# Patient Record
(demographics unavailable — no encounter records)

---

## 2025-07-23 NOTE — PROCEDURE
[1000 Hz] : 1000 Hz [Normal Eardrum Mobility] : consistent with restricted eardrum mobility [Type B Tympanogram] : Type B Flat [] : Auditory Brainstem Response: [___dBnHL] : 4000 Hz: [unfilled] dBnHL [Threshold] : threshold [Natural Sleep] : natural sleep [Clear Wavefoms] : clear waveforms  [ABR responses to ___/sec] : responses to [unfilled] /sec [de-identified] : ABR is not a true test of hearing; it is an objective test that measures brainstem activity in response to acoustic stimuli. ABR evaluates the integrity of the hearing system from the level of the cochlea up through the lower brainstem. From this, we are able to gather data to estimate hearing thresholds. Please note thresholds are reported in dBnHL. Diagnostic statement includes a correction factor of -20 dB at 500Hz,-15 dB at 1000Hz, -10dB at 2000Hz, and -5dB at 4000Hz.

## 2025-07-23 NOTE — ASSESSMENT
[FreeTextEntry1] : ABR results consistent with estimated hearing thresholds within normal limits at 500Hz, 2000Hz, and 4000Hz, bilaterally. Click stimulus presented at 65dB nHL in the right and left ears at rarefaction and condensation polarities is consistent with a normal click study.   Reviewed results with patient's parents.

## 2025-07-23 NOTE — REASON FOR VISIT
[Initial] : initial visit for [ABR Evaluation] : auditory brain response evaluation [Parents] : parents [Medical Records] : medical records [Language [employer] ] : provided by [employer]   [Interpreters_IDNumber] : [id number] [TWNoteComboBox1] : [place]

## 2025-07-23 NOTE — HISTORY OF PRESENT ILLNESS
[FreeTextEntry1] : 1 month female patient seen today for initial Auditory Brainstem Response (ABR) evaluation. Patient referred for ABR after failing NBHS, bilaterally, prior to discharge from [facility]. Patient reportedly born at 35 weeks following an uncomplicated pregnancy and delivery. No family history of hearing loss reported. No other significant medical history reported.